# Patient Record
Sex: MALE | Race: OTHER | NOT HISPANIC OR LATINO | Employment: UNEMPLOYED | URBAN - METROPOLITAN AREA
[De-identification: names, ages, dates, MRNs, and addresses within clinical notes are randomized per-mention and may not be internally consistent; named-entity substitution may affect disease eponyms.]

---

## 2020-01-23 PROBLEM — S02.642D: Status: ACTIVE | Noted: 2020-01-23

## 2020-06-01 PROBLEM — K02.9 CARIES: Status: ACTIVE | Noted: 2020-06-01

## 2020-06-23 PROBLEM — K04.7 DENTAL ABSCESS: Status: ACTIVE | Noted: 2020-06-23

## 2021-01-14 PROBLEM — S62.234A CLOSED NONDISPLACED FRACTURE OF BASE OF FIRST METACARPAL BONE OF RIGHT HAND: Status: ACTIVE | Noted: 2021-01-14

## 2021-01-14 PROBLEM — M79.641 RIGHT HAND PAIN: Status: ACTIVE | Noted: 2021-01-14

## 2023-09-04 ENCOUNTER — HOSPITAL ENCOUNTER (OUTPATIENT)
Facility: HOSPITAL | Age: 28
Discharge: HOME | DRG: 683 | End: 2023-09-06
Attending: EMERGENCY MEDICINE | Admitting: INTERNAL MEDICINE
Payer: MEDICAID

## 2023-09-04 DIAGNOSIS — D72.829 LEUKOCYTOSIS, UNSPECIFIED TYPE: ICD-10-CM

## 2023-09-04 DIAGNOSIS — N17.9 AKI (ACUTE KIDNEY INJURY): ICD-10-CM

## 2023-09-04 DIAGNOSIS — R07.9 CHEST PAIN: ICD-10-CM

## 2023-09-04 DIAGNOSIS — E86.0 DEHYDRATION: Primary | ICD-10-CM

## 2023-09-04 DIAGNOSIS — E83.52 HYPERCALCEMIA: ICD-10-CM

## 2023-09-04 PROBLEM — N17.0 ACUTE RENAL FAILURE WITH TUBULAR NECROSIS: Status: ACTIVE | Noted: 2023-09-04

## 2023-09-04 LAB
ABS NEUT (OLG): 16.67 X10(3)/MCL (ref 2.1–9.2)
ALBUMIN SERPL-MCNC: 5.8 G/DL (ref 3.5–5)
ALBUMIN/GLOB SERPL: 1.2 RATIO (ref 1.1–2)
ALP SERPL-CCNC: 88 UNIT/L (ref 40–150)
ALT SERPL-CCNC: 10 UNIT/L (ref 0–55)
AMPHET UR QL SCN: POSITIVE
APPEARANCE UR: ABNORMAL
AST SERPL-CCNC: 34 UNIT/L (ref 5–34)
BACTERIA #/AREA URNS AUTO: ABNORMAL /HPF
BARBITURATE SCN PRESENT UR: NEGATIVE
BENZODIAZ UR QL SCN: NEGATIVE
BILIRUB SERPL-MCNC: 1.7 MG/DL
BILIRUB UR QL STRIP.AUTO: NEGATIVE
BUN SERPL-MCNC: 32.7 MG/DL (ref 8.9–20.6)
CALCIUM SERPL-MCNC: 12.5 MG/DL (ref 8.4–10.2)
CANNABINOIDS UR QL SCN: POSITIVE
CHLORIDE SERPL-SCNC: 96 MMOL/L (ref 98–107)
CK SERPL-CCNC: 694 U/L (ref 30–200)
CO2 SERPL-SCNC: 21 MMOL/L (ref 22–29)
COCAINE UR QL SCN: NEGATIVE
COLOR UR: ABNORMAL
CREAT SERPL-MCNC: 5.34 MG/DL (ref 0.73–1.18)
CREAT UR-MCNC: 304.7 MG/DL (ref 63–166)
ERYTHROCYTE [DISTWIDTH] IN BLOOD BY AUTOMATED COUNT: 13.3 % (ref 11.5–17)
ETHANOL SERPL-MCNC: <10 MG/DL
FENTANYL UR QL SCN: NEGATIVE
GFR SERPLBLD CREATININE-BSD FMLA CKD-EPI: 14 MLS/MIN/1.73/M2
GLOBULIN SER-MCNC: 4.8 GM/DL (ref 2.4–3.5)
GLUCOSE SERPL-MCNC: 111 MG/DL (ref 74–100)
GLUCOSE UR QL STRIP.AUTO: NEGATIVE
HCT VFR BLD AUTO: 50.2 % (ref 42–52)
HGB BLD-MCNC: 17.6 G/DL (ref 14–18)
INSTRUMENT WBC (OLG): 20.84 X10(3)/MCL
KETONES UR QL STRIP.AUTO: ABNORMAL
LEUKOCYTE ESTERASE UR QL STRIP.AUTO: ABNORMAL
LIPASE SERPL-CCNC: 30 U/L
LYMPHOCYTES NFR BLD MANUAL: 10 %
LYMPHOCYTES NFR BLD MANUAL: 2.08 X10(3)/MCL
MACROCYTES BLD QL SMEAR: ABNORMAL
MAGNESIUM SERPL-MCNC: 2.3 MG/DL (ref 1.6–2.6)
MCH RBC QN AUTO: 31.4 PG (ref 27–31)
MCHC RBC AUTO-ENTMCNC: 35.1 G/DL (ref 33–36)
MCV RBC AUTO: 89.6 FL (ref 80–94)
MDMA UR QL SCN: NEGATIVE
MONOCYTES NFR BLD MANUAL: 10 %
MONOCYTES NFR BLD MANUAL: 2.08 X10(3)/MCL (ref 0.1–1.3)
MUCOUS THREADS URNS QL MICRO: ABNORMAL /LPF
NEUTROPHILS NFR BLD MANUAL: 80 %
NITRITE UR QL STRIP.AUTO: NEGATIVE
NRBC BLD AUTO-RTO: 0 %
NRBC BLD MANUAL-RTO: 0 %
OPIATES UR QL SCN: NEGATIVE
PCP UR QL: NEGATIVE
PH UR STRIP.AUTO: 5.5 [PH]
PH UR: 5.5 [PH] (ref 3–11)
PHOSPHATE SERPL-MCNC: 1.7 MG/DL (ref 2.3–4.7)
PLATELET # BLD AUTO: 392 X10(3)/MCL (ref 130–400)
PLATELET # BLD EST: NORMAL 10*3/UL
PMV BLD AUTO: 10.6 FL (ref 7.4–10.4)
POTASSIUM SERPL-SCNC: 5 MMOL/L (ref 3.5–5.1)
PROT SERPL-MCNC: 10.6 GM/DL (ref 6.4–8.3)
PROT UR QL STRIP.AUTO: ABNORMAL
PROT UR STRIP-MCNC: 117.1 MG/DL
RBC # BLD AUTO: 5.6 X10(6)/MCL (ref 4.7–6.1)
RBC #/AREA URNS AUTO: ABNORMAL /HPF
RBC MORPH BLD: ABNORMAL
RBC UR QL AUTO: ABNORMAL
SARS-COV-2 RDRP RESP QL NAA+PROBE: NEGATIVE
SODIUM SERPL-SCNC: 139 MMOL/L (ref 136–145)
SODIUM UR-SCNC: 29 MMOL/L
SP GR UR STRIP.AUTO: 1.02 (ref 1–1.03)
SQUAMOUS #/AREA URNS AUTO: ABNORMAL /HPF
URINE PROTEIN/CREATININE RATIO (OHS): 0.4
UROBILINOGEN UR STRIP-ACNC: 1
WBC # SPEC AUTO: 20.84 X10(3)/MCL (ref 4.5–11.5)
WBC #/AREA URNS AUTO: ABNORMAL /HPF

## 2023-09-04 PROCEDURE — 83735 ASSAY OF MAGNESIUM: CPT | Performed by: EMERGENCY MEDICINE

## 2023-09-04 PROCEDURE — 82550 ASSAY OF CK (CPK): CPT | Performed by: EMERGENCY MEDICINE

## 2023-09-04 PROCEDURE — 96376 TX/PRO/DX INJ SAME DRUG ADON: CPT

## 2023-09-04 PROCEDURE — 25000003 PHARM REV CODE 250

## 2023-09-04 PROCEDURE — 84100 ASSAY OF PHOSPHORUS: CPT | Performed by: EMERGENCY MEDICINE

## 2023-09-04 PROCEDURE — 11000001 HC ACUTE MED/SURG PRIVATE ROOM

## 2023-09-04 PROCEDURE — 96361 HYDRATE IV INFUSION ADD-ON: CPT

## 2023-09-04 PROCEDURE — 84300 ASSAY OF URINE SODIUM: CPT

## 2023-09-04 PROCEDURE — 81001 URINALYSIS AUTO W/SCOPE: CPT | Mod: 59 | Performed by: EMERGENCY MEDICINE

## 2023-09-04 PROCEDURE — 93005 ELECTROCARDIOGRAM TRACING: CPT

## 2023-09-04 PROCEDURE — G0378 HOSPITAL OBSERVATION PER HR: HCPCS

## 2023-09-04 PROCEDURE — 96375 TX/PRO/DX INJ NEW DRUG ADDON: CPT

## 2023-09-04 PROCEDURE — 96372 THER/PROPH/DIAG INJ SC/IM: CPT | Performed by: EMERGENCY MEDICINE

## 2023-09-04 PROCEDURE — 96374 THER/PROPH/DIAG INJ IV PUSH: CPT

## 2023-09-04 PROCEDURE — 80053 COMPREHEN METABOLIC PANEL: CPT | Performed by: EMERGENCY MEDICINE

## 2023-09-04 PROCEDURE — 63600175 PHARM REV CODE 636 W HCPCS: Performed by: EMERGENCY MEDICINE

## 2023-09-04 PROCEDURE — 82077 ASSAY SPEC XCP UR&BREATH IA: CPT | Performed by: EMERGENCY MEDICINE

## 2023-09-04 PROCEDURE — 21400001 HC TELEMETRY ROOM

## 2023-09-04 PROCEDURE — 83690 ASSAY OF LIPASE: CPT | Performed by: EMERGENCY MEDICINE

## 2023-09-04 PROCEDURE — 25000003 PHARM REV CODE 250: Performed by: INTERNAL MEDICINE

## 2023-09-04 PROCEDURE — 93010 EKG 12-LEAD: ICD-10-PCS | Mod: ,,, | Performed by: INTERNAL MEDICINE

## 2023-09-04 PROCEDURE — 87635 SARS-COV-2 COVID-19 AMP PRB: CPT | Performed by: EMERGENCY MEDICINE

## 2023-09-04 PROCEDURE — 85027 COMPLETE CBC AUTOMATED: CPT | Performed by: EMERGENCY MEDICINE

## 2023-09-04 PROCEDURE — 93010 ELECTROCARDIOGRAM REPORT: CPT | Mod: ,,, | Performed by: INTERNAL MEDICINE

## 2023-09-04 PROCEDURE — 80307 DRUG TEST PRSMV CHEM ANLYZR: CPT | Performed by: EMERGENCY MEDICINE

## 2023-09-04 PROCEDURE — 82570 ASSAY OF URINE CREATININE: CPT

## 2023-09-04 PROCEDURE — 25000003 PHARM REV CODE 250: Performed by: EMERGENCY MEDICINE

## 2023-09-04 PROCEDURE — 99291 CRITICAL CARE FIRST HOUR: CPT

## 2023-09-04 RX ORDER — ONDANSETRON 2 MG/ML
4 INJECTION INTRAMUSCULAR; INTRAVENOUS
Status: COMPLETED | OUTPATIENT
Start: 2023-09-04 | End: 2023-09-04

## 2023-09-04 RX ORDER — SODIUM,POTASSIUM PHOSPHATES 280-250MG
1 POWDER IN PACKET (EA) ORAL
Status: COMPLETED | OUTPATIENT
Start: 2023-09-04 | End: 2023-09-04

## 2023-09-04 RX ORDER — NALOXONE HCL 0.4 MG/ML
0.02 VIAL (ML) INJECTION
Status: DISCONTINUED | OUTPATIENT
Start: 2023-09-04 | End: 2023-09-06 | Stop reason: HOSPADM

## 2023-09-04 RX ORDER — SODIUM CHLORIDE 0.9 % (FLUSH) 0.9 %
10 SYRINGE (ML) INJECTION EVERY 12 HOURS PRN
Status: DISCONTINUED | OUTPATIENT
Start: 2023-09-04 | End: 2023-09-06 | Stop reason: HOSPADM

## 2023-09-04 RX ORDER — GLUCAGON 1 MG
1 KIT INJECTION
Status: DISCONTINUED | OUTPATIENT
Start: 2023-09-04 | End: 2023-09-06 | Stop reason: HOSPADM

## 2023-09-04 RX ORDER — IBUPROFEN 200 MG
16 TABLET ORAL
Status: DISCONTINUED | OUTPATIENT
Start: 2023-09-04 | End: 2023-09-06 | Stop reason: HOSPADM

## 2023-09-04 RX ORDER — DICYCLOMINE HYDROCHLORIDE 10 MG/ML
20 INJECTION INTRAMUSCULAR
Status: COMPLETED | OUTPATIENT
Start: 2023-09-04 | End: 2023-09-04

## 2023-09-04 RX ORDER — ENOXAPARIN SODIUM 100 MG/ML
30 INJECTION SUBCUTANEOUS EVERY 24 HOURS
Status: DISCONTINUED | OUTPATIENT
Start: 2023-09-04 | End: 2023-09-04

## 2023-09-04 RX ORDER — MORPHINE SULFATE 4 MG/ML
4 INJECTION, SOLUTION INTRAMUSCULAR; INTRAVENOUS
Status: COMPLETED | OUTPATIENT
Start: 2023-09-04 | End: 2023-09-04

## 2023-09-04 RX ORDER — FAMOTIDINE 10 MG/ML
20 INJECTION INTRAVENOUS
Status: COMPLETED | OUTPATIENT
Start: 2023-09-04 | End: 2023-09-04

## 2023-09-04 RX ORDER — MUPIROCIN 20 MG/G
OINTMENT TOPICAL 2 TIMES DAILY
Status: DISCONTINUED | OUTPATIENT
Start: 2023-09-04 | End: 2023-09-06 | Stop reason: HOSPADM

## 2023-09-04 RX ORDER — IBUPROFEN 200 MG
24 TABLET ORAL
Status: DISCONTINUED | OUTPATIENT
Start: 2023-09-04 | End: 2023-09-06 | Stop reason: HOSPADM

## 2023-09-04 RX ORDER — SODIUM CHLORIDE 9 MG/ML
INJECTION, SOLUTION INTRAVENOUS CONTINUOUS
Status: DISCONTINUED | OUTPATIENT
Start: 2023-09-04 | End: 2023-09-06 | Stop reason: HOSPADM

## 2023-09-04 RX ORDER — ONDANSETRON 2 MG/ML
4 INJECTION INTRAMUSCULAR; INTRAVENOUS EVERY 6 HOURS PRN
Status: DISCONTINUED | OUTPATIENT
Start: 2023-09-04 | End: 2023-09-06 | Stop reason: HOSPADM

## 2023-09-04 RX ADMIN — SODIUM CHLORIDE 1000 ML: 9 INJECTION, SOLUTION INTRAVENOUS at 06:09

## 2023-09-04 RX ADMIN — ONDANSETRON 4 MG: 2 INJECTION INTRAMUSCULAR; INTRAVENOUS at 08:09

## 2023-09-04 RX ADMIN — POTASSIUM & SODIUM PHOSPHATES POWDER PACK 280-160-250 MG 1 PACKET: 280-160-250 PACK at 06:09

## 2023-09-04 RX ADMIN — FAMOTIDINE 20 MG: 10 INJECTION, SOLUTION INTRAVENOUS at 06:09

## 2023-09-04 RX ADMIN — POTASSIUM & SODIUM PHOSPHATES POWDER PACK 280-160-250 MG 1 PACKET: 280-160-250 PACK at 01:09

## 2023-09-04 RX ADMIN — ONDANSETRON 4 MG: 2 INJECTION INTRAMUSCULAR; INTRAVENOUS at 06:09

## 2023-09-04 RX ADMIN — SODIUM CHLORIDE 1000 ML: 9 INJECTION, SOLUTION INTRAVENOUS at 07:09

## 2023-09-04 RX ADMIN — MORPHINE SULFATE 4 MG: 4 INJECTION INTRAVENOUS at 08:09

## 2023-09-04 RX ADMIN — DICYCLOMINE HYDROCHLORIDE 20 MG: 20 INJECTION, SOLUTION INTRAMUSCULAR at 06:09

## 2023-09-04 RX ADMIN — MUPIROCIN: 20 OINTMENT TOPICAL at 10:09

## 2023-09-04 RX ADMIN — SODIUM CHLORIDE: 9 INJECTION, SOLUTION INTRAVENOUS at 08:09

## 2023-09-04 NOTE — ED PROVIDER NOTES
Encounter Date: 9/4/2023       History     Chief Complaint   Patient presents with    Vomiting    Spasms     C/o generalized muscle cramps, alberto flank pain,  decreased appetite, and states he hasn't urinated x2 days.... states the symptoms started a day ago, when asked again. Pt has multiple complaints, story is very hard to follow. Denies any medical/sx hx.      28-year-old male with no significant past medical history presenting to the ED for evaluation of multiple complaints.  He endorses general myalgias and abdominal cramping as well as nausea and vomiting for the past 1-2 days.  He states he was not urinated since Friday.  He states he is had frequent belching which is often followed by nonbloody nonbilious emesis.  Last bowel movement was also on Friday.  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  He did not work outside and Friday but did on Saturday.  He denies any fever or chills, chest pain or shortness of breath and complains of bilateral flank pain    The history is provided by the patient and a relative. No  was used.   Emesis   This is a new problem. The current episode started yesterday. The problem occurs 5 - 10 times per day. The problem has been unchanged. The emesis has an appearance of stomach contents. Associated symptoms include abdominal pain and myalgias. Pertinent negatives include no cough and no fever.   Spasms  This is a new problem. The current episode started yesterday. The problem occurs constantly. The problem has not changed since onset.Associated symptoms include abdominal pain. Pertinent negatives include no chest pain and no shortness of breath. Nothing aggravates the symptoms. Nothing relieves the symptoms. He has tried nothing for the symptoms. The treatment provided no relief.     Review of patient's allergies indicates:  No Known Allergies  History reviewed. No pertinent past medical history.  Past Surgical  History:   Procedure Laterality Date    ORIF MANDIBULAR FRACTURE Bilateral 1/23/2020    Procedure: ORIF, FRACTURE, MANDIBLE SYMPHYSIS and LEFT ANGLE;  Surgeon: Klaus Rodriguez DDS;  Location: [facility] MAIN OR;  Service: Oral Surgery;  Laterality: Bilateral;     No family history on file.  Social History     Tobacco Use    Smoking status: Every Day     Current packs/day: 1.00     Types: Cigarettes    Smokeless tobacco: Former   Substance Use Topics    Alcohol use: Yes     Comment: occasionally    Drug use: Yes     Types: Marijuana     Review of Systems   Constitutional:  Positive for appetite change. Negative for activity change, diaphoresis, fatigue and fever.   HENT:  Positive for rhinorrhea. Negative for congestion, postnasal drip, sinus pain, sneezing and sore throat.    Respiratory:  Negative for cough, chest tightness, shortness of breath and wheezing.    Cardiovascular:  Negative for chest pain, palpitations and leg swelling.   Gastrointestinal:  Positive for abdominal pain, constipation, nausea and vomiting. Negative for abdominal distention and blood in stool.   Genitourinary:  Negative for decreased urine volume, difficulty urinating and dysuria.   Musculoskeletal:  Positive for myalgias.   Skin:  Negative for color change and pallor.   Neurological:  Negative for dizziness, speech difficulty, weakness, light-headedness and numbness.   All other systems reviewed and are negative.      Physical Exam     Initial Vitals [09/04/23 0541]   BP Pulse Resp Temp SpO2   121/69 87 18 97.4 °F (36.3 °C) 98 %      MAP       --         Physical Exam    Nursing note and vitals reviewed.  Constitutional: He appears well-developed and well-nourished. He is not diaphoretic. No distress.   HENT:   Head: Normocephalic and atraumatic.   Nose: Nose normal.   Mouth/Throat: Oropharynx is clear and moist.   Eyes: Conjunctivae and EOM are normal. Pupils are equal, round, and reactive to light.   Neck: Trachea normal. Neck supple.    Normal range of motion.  Cardiovascular:  Normal rate, regular rhythm, normal heart sounds and intact distal pulses.     Exam reveals no gallop and no friction rub.       No murmur heard.  Pulmonary/Chest: Breath sounds normal. No respiratory distress. He has no wheezes. He has no rhonchi. He has no rales. He exhibits no tenderness.   Abdominal: Abdomen is soft. He exhibits no distension and no mass. There is no abdominal tenderness.   Hypoactive bowel sonds There is no rebound.   Musculoskeletal:         General: Tenderness present. No edema. Normal range of motion.      Cervical back: Normal range of motion and neck supple.      Lumbar back: Normal. No tenderness. Normal range of motion.      Comments: Bilateral flank tenderness to palpation     Neurological: He is alert and oriented to person, place, and time. He has normal strength. No cranial nerve deficit or sensory deficit.   Skin: Skin is warm and dry. Capillary refill takes less than 2 seconds. No rash and no abscess noted. No erythema. No pallor.   Psychiatric: He has a normal mood and affect. His behavior is normal. Judgment and thought content normal.         ED Course   Critical Care    Date/Time: 9/4/2023 7:47 AM    Performed by: Susana Tovar MD  Authorized by: Susana Tovar MD  Direct patient critical care time: 65 minutes  Total critical care time (exclusive of procedural time) : 65 minutes  Critical care was necessary to treat or prevent imminent or life-threatening deterioration of the following conditions: dehydration, metabolic crisis and renal failure.  Critical care was time spent personally by me on the following activities: development of treatment plan with patient or surrogate, discussions with consultants, evaluation of patient's response to treatment, examination of patient, obtaining history from patient or surrogate, ordering and performing treatments and interventions, ordering and review of laboratory studies, ordering  and review of radiographic studies, pulse oximetry, re-evaluation of patient's condition and review of old charts.        Labs Reviewed   COMPREHENSIVE METABOLIC PANEL - Abnormal; Notable for the following components:       Result Value    Chloride 96 (*)     Carbon Dioxide 21 (*)     Glucose Level 111 (*)     Blood Urea Nitrogen 32.7 (*)     Creatinine 5.34 (*)     Calcium Level Total 12.5 (*)     Protein Total 10.6 (*)     Albumin Level 5.8 (*)     Globulin 4.8 (*)     Bilirubin Total 1.7 (*)     All other components within normal limits   URINALYSIS, REFLEX TO URINE CULTURE - Abnormal; Notable for the following components:    Appearance, UA Turbid (*)     Protein, UA 2+ (*)     Ketones, UA Trace (*)     Blood, UA Trace (*)     Leukocyte Esterase, UA Trace (*)     All other components within normal limits   DRUG SCREEN, URINE (BEAKER) - Abnormal; Notable for the following components:    Amphetamines, Urine Positive (*)     Cannabinoids, Urine Positive (*)     All other components within normal limits    Narrative:     Cut off concentrations:    Amphetamines - 1000 ng/ml  Barbiturates - 200 ng/ml  Benzodiazepine - 200 ng/ml  Cannabinoids (THC) - 50 ng/ml  Cocaine - 300 ng/ml  Fentanyl - 1.0 ng/ml  MDMA - 500 ng/ml  Opiates - 300 ng/ml   Phencyclidine (PCP) - 25 ng/ml    Specimen submitted for drug analysis and tested for pH and specific gravity in order to evaluate sample integrity. Suspect tampering if specific gravity is <1.003 and/or pH is not within the range of 4.5 - 8.0  False negatives may result form substances such as bleach added to urine.  False positives may result for the presence of a substance with similar chemical structure to the drug or its metabolite.    This test provides only a PRELIMINARY analytical test result. A more specific alternate chemical method must be used in order to obtain a confirmed analytical result. Gas chromatography/mass spectrometry (GC/MS) is the preferred confirmatory  method. Other chemical confirmation methods are available. Clinical consideration and professional judgement should be applied to any drug of abuse test result, particularly when preliminary positive results are used.    Positive results will be confirmed only at the physicians request. Unconfirmed screening results are to be used only for medical purposes (treatment).        CBC WITH DIFFERENTIAL - Abnormal; Notable for the following components:    WBC 20.84 (*)     MCH 31.4 (*)     MPV 10.6 (*)     All other components within normal limits   MANUAL DIFFERENTIAL - Abnormal; Notable for the following components:    Neutrophils Abs 16.672 (*)     Monocytes Abs 2.084 (*)     RBC Morph Abnormal (*)     Macrocytosis 1+ (*)     All other components within normal limits   PHOSPHORUS - Abnormal; Notable for the following components:    Phosphorus Level 1.7 (*)     All other components within normal limits   CK - Abnormal; Notable for the following components:    Creatine Kinase 694 (*)     All other components within normal limits   URINALYSIS, MICROSCOPIC - Abnormal; Notable for the following components:    Mucous, UA Occ (*)     All other components within normal limits   SARS-COV-2 RNA AMPLIFICATION, QUAL - Normal    Narrative:     The IDNOW COVID-19 assay is a rapid molecular in vitro diagnostic test utilizing an isothermal nucleic acid amplification technology intended for the qualitative detection of nucleic acid from the SARS-CoV-2 viral RNA in direct nasal, nasopharyngeal or throat swabs from individuals who are suspected of COVID-19 by their healthcare provider.   LIPASE - Normal   ALCOHOL,MEDICAL (ETHANOL) - Normal   MAGNESIUM - Normal   CBC W/ AUTO DIFFERENTIAL    Narrative:     The following orders were created for panel order CBC auto differential.  Procedure                               Abnormality         Status                     ---------                               -----------         ------                      CBC with Differential[[id number]]        Abnormal            Final result               Manual Differential[[id number]]          Abnormal            Final result                 Please view results for these tests on the individual orders.   PROTEIN / CREATININE RATIO, URINE   SODIUM, URINE, RANDOM          Imaging Results              CT Abdomen Pelvis  Without Contrast (Final result)  Result time 09/04/23 09:04:53      Final result by Tobi Cabral MD (09/04/23 09:04:53)                   Impression:      No acute abnormality seen      Electronically signed by: Tobi Cabral  Date:    09/04/2023  Time:    09:04               Narrative:    EXAMINATION:  CT ABDOMEN PELVIS WITHOUT CONTRAST    CLINICAL HISTORY:  Abdominal pain, acute, nonlocalized;    TECHNIQUE:  Low dose axial images, sagittal and coronal reformations were obtained from the lung bases to the pubic symphysis.  No contrast was administered.  Automatic exposure control is utilized to reduce patient radiation exposure.    COMPARISON:  None    FINDINGS:  The lung bases are clear.    The liver appears normal.  No obvious liver mass or lesion is seen.    Gallbladder appears normal.  No gallstones are seen.    The pancreas appears normal.  No inflammatory changes are seen in the pancreatic region.    The spleen appears normal and adrenal glands appear normal.  No adrenal nodule is seen.    No nephrolithiasis is seen.  No hydronephrosis is seen.  No hydroureter is seen.  No ureteral stone is seen.    No colitis is seen.  No diverticulitis is seen.  No appendicitis is seen.    No free air seen.  No free fluid is seen.  The urinary bladder appears normal.    There is a lytic area seen within the iliac bone on the left side which has a benign appearance.                                       US Retroperitoneal Limited (Final result)  Result time 09/04/23 08:15:25      Final result by Zechariah Brown MD (09/04/23 08:15:25)                    Impression:      1. No hydronephrosis.  2. Possible medical renal disease.      Electronically signed by: Zechariah Brown  Date:    09/04/2023  Time:    08:15               Narrative:    EXAMINATION:  US RETROPERITONEAL LIMITED    CLINICAL HISTORY:  Acute kidney failure, unspecified    COMPARISON:  None.    FINDINGS:  Grayscale and color Doppler sonographic evaluation of the kidneys and urinary bladder.    The right kidney measures 8.5 cm. The left kidney measures 9 cm.   No hydronephrosis.  There is slightly increased renal parenchymal echogenicity.    The bladder is not well distended.                                       Medications   0.9%  NaCl infusion ( Intravenous New Bag 9/4/23 0845)   sodium chloride 0.9% flush 10 mL (has no administration in time range)   naloxone 0.4 mg/mL injection 0.02 mg (has no administration in time range)   glucose chewable tablet 16 g (has no administration in time range)   glucose chewable tablet 24 g (has no administration in time range)   glucagon (human recombinant) injection 1 mg (has no administration in time range)   enoxaparin injection 30 mg (has no administration in time range)   dextrose 10% bolus 125 mL 125 mL (has no administration in time range)   dextrose 10% bolus 250 mL 250 mL (has no administration in time range)   ondansetron injection 4 mg (has no administration in time range)   sodium chloride 0.9% bolus 1,000 mL 1,000 mL (0 mLs Intravenous Stopped 9/4/23 0739)   ondansetron injection 4 mg (4 mg Intravenous Given 9/4/23 0639)   dicyclomine injection 20 mg (20 mg Intramuscular Given 9/4/23 0639)   famotidine (PF) injection 20 mg (20 mg Intravenous Given 9/4/23 0639)   sodium chloride 0.9% bolus 1,000 mL 1,000 mL (0 mLs Intravenous Stopped 9/4/23 0845)   morphine injection 4 mg (4 mg Intravenous Given 9/4/23 0816)   ondansetron injection 4 mg (4 mg Intravenous Given 9/4/23 0815)     Medical Decision Making  Given patient's presentation, differential diagnosis  includes but is not limited to florencio, uti, ureterolithiasis, dehydration, electrolyte derangement, rhabdomyolysis, viral syndrome  To evaluate these  possible etiologies cbc, cmp, ua, uds, ethanol, covid, mag, phos, ck were ordered and reviewed    Leukocytosis likely demargination due to stress, acute significant oliguric renal failure without significant acidosis or hyperkalemia  Given ivf, zofran and pepcid which resolved nausea and vomiting  Bentyl helped with abdominal cramping  Retroperitoneal us ordered    Problems Addressed:  FLORENCIO (acute kidney injury): acute illness or injury that poses a threat to life or bodily functions  Dehydration: acute illness or injury that poses a threat to life or bodily functions  Hypercalcemia: acute illness or injury that poses a threat to life or bodily functions  Leukocytosis, unspecified type: acute illness or injury    Amount and/or Complexity of Data Reviewed  Independent Historian: caregiver  External Data Reviewed: notes.  Labs: ordered.  Radiology: ordered.    Risk  OTC drugs.  Prescription drug management.  Parenteral controlled substances.  Decision regarding hospitalization.    Critical Care  Total time providing critical care: 65 minutes               ED Course as of 09/04/23 1150   Mon Sep 04, 2023   0732 Nausea and pain have improved [BS]   0734 States he was not been taking any over-the-counter medications [BS]      ED Course User Index  [BS] Susana Tovar MD               Medical Decision Making:   History:   Old Medical Records: I decided to obtain old medical records.  Old Records Summarized: records from previous admission(s).       <> Summary of Records: Previous ed visits for unrelated complaints  Initial Assessment:   See hpi  Clinical Tests:   Lab Tests: Ordered and Reviewed  Radiological Study: Ordered and Reviewed  Other:   I have discussed this case with another health care provider.      Clinical Impression:   Final diagnoses:  [N17.9] FLORENCIO (acute  kidney injury)  [E86.0] Dehydration (Primary)  [E83.52] Hypercalcemia  [D72.829] Leukocytosis, unspecified type        ED Disposition Condition    Admit Stable                Susana Tovar MD  09/04/23 4995

## 2023-09-04 NOTE — H&P
[facility] MEDICINE - H&P ADMISSION NOTE    Patient Name: Amirah Moore  MRN: [id number]  Patient Class: IP- Inpatient   Admission Date: 9/4/2023   Admitting Physician: KUSHAL Service   Attending Physician: Steven Ramirez MD  Primary Care Provider: Malena Primary Doctor  Face-to-Face encounter date: 09/04/2023      CHIEF COMPLAINT     Chief Complaint   Patient presents with    Vomiting    Spasms     C/o generalized muscle cramps, alberto flank pain,  decreased appetite, and states he hasn't urinated x2 days.... states the symptoms started a day ago, when asked again. Pt has multiple complaints, story is very hard to follow. Denies any medical/sx hx.        HISTORY OF PRESENTING ILLNESS     Mr. Moore is a 29 y/o AAM with no significant PMH who presents to [facility] ED with initial complaint of general myalgias and abdominal cramping as well as multiple episodes of non-bloody non bilious emesis x2 days.  He also reports difficulty with urination since Friday stating he had significant pain upon initiation of urination.  Denies any dysuria or frequency.  Endorses bilateral flank pain beginning 2 days ago.  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  He attempted to stay hydrated but states he does not think he was drinking enough water and endorses associated heat exhaustion.  He denies any history of similar prior episodes and has no known medical history for which he takes daily medications.  He denies any recent otc NSAID use.  He does endorse recent marijuana use but denies any other recreational drug use or history of IV drug use.  He uses a vape daily but denies any cigarette use or other tobacco products.  He reports drinking alcohol socially and denies daily alcohol intake.  Of note, patient was recently released from an 8-year group home sentence approx 2 weeks ago.     In the ED, patients vitals were stable with HR 87, RR  18, /69, temp 97.4, and spO2 99% on room air.  Labs were significant for WBC 20.84, BUN/Cr of 32.7/5.34, calcium of 12.5, phosphorous of 1.7, CPK of 694, UDS positive for amphetamines and marijuana, and urinalysis with 2+ protein.  Retroperitoneal US performed showing no hydronephrosis with possible medical renal disease.  CT A/P was also performed which showed no acute abnormalities.  Internal medicine was consulted secondary to acute renal failure.      PAST MEDICAL HISTORY   History reviewed. No pertinent past medical history.    PAST SURGICAL HISTORY     Past Surgical History:   Procedure Laterality Date    ORIF MANDIBULAR FRACTURE Bilateral 1/23/2020    Procedure: ORIF, FRACTURE, MANDIBLE SYMPHYSIS and LEFT ANGLE;  Surgeon: Klaus Rodriguez DDS;  Location: [facility] OR;  Service: Oral Surgery;  Laterality: Bilateral;       FAMILY HISTORY   Reviewed and noncontributory to this case    SOCIAL HISTORY     Social History     Socioeconomic History    Marital status: Single   Tobacco Use    Smoking status: Every Day     Current packs/day: 1.00     Types: Cigarettes    Smokeless tobacco: Former   Substance and Sexual Activity    Alcohol use: Yes     Comment: occasionally    Drug use: Yes     Types: Marijuana    Sexual activity: Not Currently     Social Determinants of Health     Stress: No Stress Concern Present (8/4/2020)    [employer] [place] [employer] - Occupational Stress Questionnaire     Feeling of Stress : Not at all       HOME MEDICATIONS     Prior to Admission medications    Medication Sig Start Date End Date Taking? Authorizing Provider   chlorhexidine (PERIDEX) 0.12 % solution Swish and spit 15 mL two times daily.  Patient not taking: Reported on 8/4/2020 1/23/20   Ena Sameera, DDS   hydrocodone-acetaminophen (HYCET) solution 7.5-325 mg/15mL Take 15 mLs by mouth every 6 (six) hours as needed for Pain.  Patient not taking: Reported on 2/24/2020 1/23/20   Ena Sameera, DDS   ibuprofen  (ADVIL,MOTRIN) 100 mg/5 mL suspension Take 30 mLs (600 mg total) by mouth every 6 (six) hours as needed for Temperature greater than.  Patient not taking: Reported on 8/4/2020 1/23/20   Sameera Sutherland DDS   ibuprofen (ADVIL,MOTRIN) 600 MG tablet Take 1 tablet (600 mg total) by mouth 3 (three) times daily.  Patient not taking: Reported on 8/4/2020 6/23/20   Triny Holguin MD   ibuprofen (ADVIL,MOTRIN) 800 MG tablet Take 800 mg by mouth 2 (two) times a day.    Provider, Historical   omeprazole (PRILOSEC) 20 MG capsule Take 20 mg by mouth once daily.    Provider, Historical   ondansetron (ZOFRAN-ODT) 4 MG TbDL Take 1 tablet (4 mg total) by mouth every 12 (twelve) hours.  Patient not taking: Reported on 2/10/2020 1/23/20   Sameera Sutherland DDS     ALLERGIES   Patient has no known allergies.    REVIEW OF SYSTEMS   Except as documented above, all other systems reviewed and negative    PHYSICAL EXAM     Vitals:    09/04/23 1102   BP: 121/66   Pulse: 78   Resp:    Temp:       General:  In no acute distress, resting comfortably  Head and neck:  Atraumatic, normocephalic, moist mucous membranes, supple neck  Chest:  Clear to auscultation bilaterally  Heart:  S1, S2, no appreciable murmur  Abdomen:  Soft, nontender, BS +, bilateral flank pain present   MSK:  Warm, no lower extremity edema, no clubbing or cyanosis  Neuro:  Alert and oriented x4, moving all extremities with good strength  Integumentary:  No obvious skin rash  Psychiatry:  Appropriate mood and affect    ASSESSMENT AND PLAN     Acute renal failure likely 2/2 ATN   Leukocytosis suspect 2/2 hemoconcentration   Hypercalcemia   Hypophosphatemia   Polysubstance abuse   Mild rhabdomyolysis   Lytic lesion of left iliac bone     -Retroperitoneal US and CT A/P performed in ED showing no acute abnormalities although CT scan was notable for lytic lesion present to left iliac bone.    -given 2L NS in ED, will continue fluid resuscitation with NS @125ml/hr   -ordering urine  sodium and urine protein/creatinine ratio for further workup, will continue to monitor renal indices closely.  Avoid nephrotoxic medications.   -ordering PTH in setting of above electrolyte abnormalities. Also ordering SPEP for further workup of lytic lesion.   -continue to monitor electrolytes closely and replete as needed    DVT prophylaxis:  patient ambulatory   __________________________________________________________________  LABS/MICRO/MEDS/DIAGNOSTICS     LABS  Recent Labs     09/04/23  0632      K 5.0   CHLORIDE 96*   CO2 21*   BUN 32.7*   CREATININE 5.34*   GLUCOSE 111*   CALCIUM 12.5*   ALKPHOS 88   AST 34   ALT 10   ALBUMIN 5.8*     Recent Labs     09/04/23  0632   WBC 20.84  20.84*   RBC 5.60   HCT 50.2   MCV 89.6        MICROBIOLOGY  Microbiology Results (last 7 days)       ** No results found for the last 168 hours. **          MEDICATIONS   mupirocin   Nasal BID    potassium, sodium phosphates  1 packet Oral Q6H      INFUSIONS   sodium chloride 0.9% 125 mL/hr at 09/04/23 1154     DIAGNOSTIC TESTS  CT Abdomen Pelvis  Without Contrast   Final Result      No acute abnormality seen         Electronically signed by: Tobi Cabral   Date:    09/04/2023   Time:    09:04      US Retroperitoneal Limited   Final Result      1. No hydronephrosis.   2. Possible medical renal disease.         Electronically signed by: Zechariah Brown   Date:    09/04/2023   Time:    08:15         Patient information was obtained from patient, patient's family, past medical records and ER records.   All diagnosis and differential diagnosis have been reviewed; assessment and plan has been documented. I have personally reviewed the labs and test results that are presently available; I have reviewed the patients medication list. I have reviewed the consulting providers response and recommendations. I have reviewed or attempted to review medical records based upon their availability.  All of the patient's questions  have been addressed and answered. Patient's is agreeable to the above stated plan. I will continue to monitor closely and make adjustments to medical management as needed.  This note was created using [employer] voice recognition software that occasionally misinterpreted phrases or words.  Please contact me if any questions may rise regarding documentation to clarify verbiage.      Aaliyah Hi MD   Internal Medicine  Department of Hospital Medicine  [facility] - Emergency Dept

## 2023-09-05 LAB
ALBUMIN SERPL-MCNC: 3.4 G/DL (ref 3.5–5)
ALBUMIN/GLOB SERPL: 1.2 RATIO (ref 1.1–2)
ALP SERPL-CCNC: 54 UNIT/L (ref 40–150)
ALT SERPL-CCNC: 7 UNIT/L (ref 0–55)
AST SERPL-CCNC: 29 UNIT/L (ref 5–34)
BASOPHILS # BLD AUTO: 0.02 X10(3)/MCL
BASOPHILS NFR BLD AUTO: 0.2 %
BILIRUB SERPL-MCNC: 1 MG/DL
BUN SERPL-MCNC: 30.1 MG/DL (ref 8.9–20.6)
CALCIUM SERPL-MCNC: 8.9 MG/DL (ref 8.4–10.2)
CHLORIDE SERPL-SCNC: 106 MMOL/L (ref 98–107)
CO2 SERPL-SCNC: 25 MMOL/L (ref 22–29)
CREAT SERPL-MCNC: 1.96 MG/DL (ref 0.73–1.18)
EOSINOPHIL # BLD AUTO: 0.06 X10(3)/MCL (ref 0–0.9)
EOSINOPHIL NFR BLD AUTO: 0.7 %
ERYTHROCYTE [DISTWIDTH] IN BLOOD BY AUTOMATED COUNT: 13.8 % (ref 11.5–17)
GFR SERPLBLD CREATININE-BSD FMLA CKD-EPI: 47 MLS/MIN/1.73/M2
GLOBULIN SER-MCNC: 2.8 GM/DL (ref 2.4–3.5)
GLUCOSE SERPL-MCNC: 93 MG/DL (ref 74–100)
HCT VFR BLD AUTO: 38.4 % (ref 42–52)
HGB BLD-MCNC: 12.9 G/DL (ref 14–18)
IGA SERPL-MCNC: 244 MG/DL (ref 63–484)
IGG SERPL-MCNC: 1029 MG/DL (ref 540–1822)
IGM SERPL-MCNC: 57 MG/DL (ref 22–240)
IMM GRANULOCYTES # BLD AUTO: 0.02 X10(3)/MCL (ref 0–0.04)
IMM GRANULOCYTES NFR BLD AUTO: 0.2 %
LYMPHOCYTES # BLD AUTO: 2.57 X10(3)/MCL (ref 0.6–4.6)
LYMPHOCYTES NFR BLD AUTO: 31.7 %
MAGNESIUM SERPL-MCNC: 2.2 MG/DL (ref 1.6–2.6)
MCH RBC QN AUTO: 31.9 PG (ref 27–31)
MCHC RBC AUTO-ENTMCNC: 33.6 G/DL (ref 33–36)
MCV RBC AUTO: 95 FL (ref 80–94)
MONOCYTES # BLD AUTO: 0.89 X10(3)/MCL (ref 0.1–1.3)
MONOCYTES NFR BLD AUTO: 11 %
NEUTROPHILS # BLD AUTO: 4.54 X10(3)/MCL (ref 2.1–9.2)
NEUTROPHILS NFR BLD AUTO: 56.2 %
NRBC BLD AUTO-RTO: 0 %
PHOSPHATE SERPL-MCNC: 3.7 MG/DL (ref 2.3–4.7)
PLATELET # BLD AUTO: 265 X10(3)/MCL (ref 130–400)
PMV BLD AUTO: 10.5 FL (ref 7.4–10.4)
POTASSIUM SERPL-SCNC: 4.2 MMOL/L (ref 3.5–5.1)
PROT SERPL-MCNC: 6.2 GM/DL (ref 6.4–8.3)
PTH-INTACT SERPL-MCNC: 68.9 PG/ML (ref 8.7–77)
RBC # BLD AUTO: 4.04 X10(6)/MCL (ref 4.7–6.1)
SODIUM SERPL-SCNC: 138 MMOL/L (ref 136–145)
WBC # SPEC AUTO: 8.1 X10(3)/MCL (ref 4.5–11.5)

## 2023-09-05 PROCEDURE — 83970 ASSAY OF PARATHORMONE: CPT

## 2023-09-05 PROCEDURE — 96361 HYDRATE IV INFUSION ADD-ON: CPT

## 2023-09-05 PROCEDURE — 84100 ASSAY OF PHOSPHORUS: CPT

## 2023-09-05 PROCEDURE — G0378 HOSPITAL OBSERVATION PER HR: HCPCS

## 2023-09-05 PROCEDURE — 83521 IG LIGHT CHAINS FREE EACH: CPT

## 2023-09-05 PROCEDURE — 83735 ASSAY OF MAGNESIUM: CPT

## 2023-09-05 PROCEDURE — 86334 IMMUNOFIX E-PHORESIS SERUM: CPT

## 2023-09-05 PROCEDURE — 85025 COMPLETE CBC W/AUTO DIFF WBC: CPT

## 2023-09-05 PROCEDURE — 80053 COMPREHEN METABOLIC PANEL: CPT

## 2023-09-05 PROCEDURE — 84165 PROTEIN E-PHORESIS SERUM: CPT

## 2023-09-05 PROCEDURE — 25000003 PHARM REV CODE 250

## 2023-09-05 PROCEDURE — 21400001 HC TELEMETRY ROOM

## 2023-09-05 PROCEDURE — 82784 ASSAY IGA/IGD/IGG/IGM EACH: CPT

## 2023-09-05 RX ADMIN — MUPIROCIN: 20 OINTMENT TOPICAL at 08:09

## 2023-09-05 RX ADMIN — SODIUM CHLORIDE: 9 INJECTION, SOLUTION INTRAVENOUS at 10:09

## 2023-09-05 RX ADMIN — MUPIROCIN: 20 OINTMENT TOPICAL at 09:09

## 2023-09-05 NOTE — NURSING
Nurses Note -- 4 Eyes      9/5/2023   5:49 AM      Skin assessed during: Admit      [x] No Altered Skin Integrity Present    [x]Prevention Measures Documented      [] Yes- Altered Skin Integrity Present or Discovered   [] LDA Added if Not in Epic (Describe Wound)   [] New Altered Skin Integrity was Present on Admit and Documented in LDA   [] Wound Image Taken    Wound Care Consulted? No    Attending Nurse:  Kayden Hodges RN/Staff Member:   Sivakumar Rivas RN

## 2023-09-05 NOTE — PROGRESS NOTES
[facility]  Hospital Medicine Progress Note        Chief Complaint: Inpatient Follow-up    HPI:   29 y/o AAM with no significant PMH who presents to [facility] ED with initial complaint of general myalgias and abdominal cramping as well as multiple episodes of non-bloody non bilious emesis x2 days.  He also reports difficulty with urination since Friday stating he had significant pain upon initiation of urination.  Denies any dysuria or frequency.  Endorses bilateral flank pain beginning 2 days ago.  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  He attempted to stay hydrated but states he does not think he was drinking enough water and endorses associated heat exhaustion.  He denies any history of similar prior episodes and has no known medical history for which he takes daily medications.  He denies any recent otc NSAID use.  He does endorse recent marijuana use but denies any other recreational drug use or history of IV drug use.  He uses a vape daily but denies any cigarette use or other tobacco products.  He reports drinking alcohol socially and denies daily alcohol intake.  Of note, patient was recently released from an 8-year snf sentence approx 2 weeks ago.      In the ED, patients vitals were stable with HR 87, RR 18, /69, temp 97.4, and spO2 99% on room air.  Labs were significant for WBC 20.84, BUN/Cr of 32.7/5.34, calcium of 12.5, phosphorous of 1.7, CPK of 694, UDS positive for amphetamines and marijuana, and urinalysis with 2+ protein.  Retroperitoneal US performed showing no hydronephrosis with possible medical renal disease.  CT A/P was also performed which showed no acute abnormalities.  Hospital medicine service was consulted for admission and further management.       Interval Hx:   No acute events reported.  Seen and examined.  Patient reported feeling better denied any nausea vomiting labs this  morning reviewed his BUN to creatinine much improved with IV fluids.  BUN 30.1 creatinine 1.96 magnesium 2.2, phos 3.7, PTH intact within normal limits immunoglobulin levels within normal limits  Case was discussed with patient's nurse and  on the floor.    Objective/physical exam:  General: In no acute distress, afebrile  Chest: Clear to auscultation bilaterally  Heart: RRR, +S1, S2, no appreciable murmur  Abdomen: Soft, nontender, BS +  MSK: Warm, no lower extremity edema, no clubbing or cyanosis  Neurologic: Alert and oriented x4, Cranial nerve II-XII intact, Strength 5/5 in all 4 extremities    VITAL SIGNS: 24 HRS MIN & MAX LAST   Temp  Min: 97.3 °F (36.3 °C)  Max: 98 °F (36.7 °C) 98 °F (36.7 °C)   BP  Min: 96/57  Max: 136/91 115/74   Pulse  Min: 61  Max: 78  64   Resp  Min: 16  Max: 16 16   SpO2  Min: 95 %  Max: 100 % 98 %     I have reviewed the following labs:  Recent Labs   Lab 09/04/23  0632 09/05/23  0815   WBC 20.84  20.84* 8.10   RBC 5.60 4.04*   HGB 17.6 12.9*   HCT 50.2 38.4*   MCV 89.6 95.0*   MCH 31.4* 31.9*   MCHC 35.1 33.6   RDW 13.3 13.8    265   MPV 10.6* 10.5*     Recent Labs   Lab 09/04/23  0632 09/05/23  0402    138   K 5.0 4.2   CO2 21* 25   BUN 32.7* 30.1*   CREATININE 5.34* 1.96*   CALCIUM 12.5* 8.9   MG 2.30 2.20   ALBUMIN 5.8* 3.4*   ALKPHOS 88 54   ALT 10 7   AST 34 29   BILITOT 1.7* 1.0     Microbiology Results (last 7 days)       ** No results found for the last 168 hours. **             See below for Radiology    Scheduled Med:   mupirocin   Nasal BID      Continuous Infusions:   sodium chloride 0.9% 125 mL/hr at 09/05/23 1014      PRN Meds:  dextrose 10%, dextrose 10%, glucagon (human recombinant), glucose, glucose, naloxone, ondansetron, sodium chloride 0.9%     Assessment/Plan:  Acute kidney injury suspected  2/2 ATN   Leukocytosis suspect 2/2 hemoconcentration   Hypercalcemia   Hypophosphatemia   Polysubstance abuse   Mild rhabdomyolysis   Lytic lesion of  left iliac bone     Renal indices much improved calcium levels improved to 8.9  SPEP UPEP pending  Continue IV hydration today and monitor urine output  Provide supportive care  Recheck labs in the a.m.  Encourage p.o. intake   Encourage drug cessation  Care discussed with patient's nurse, case management  Labs in the a.m.    VTE prophylaxis:  SCDs patient ambulatory    Patient condition:  Stable    Anticipated discharge and Disposition:   Likely in the next 24-48 hours based on labs      _____________________________________________________________________    Nutrition Status:    Radiology:  I have personally reviewed the following imaging and agree with the radiologist.     CT Abdomen Pelvis  Without Contrast  Narrative: EXAMINATION:  CT ABDOMEN PELVIS WITHOUT CONTRAST    CLINICAL HISTORY:  Abdominal pain, acute, nonlocalized;    TECHNIQUE:  Low dose axial images, sagittal and coronal reformations were obtained from the lung bases to the pubic symphysis.  No contrast was administered.  Automatic exposure control is utilized to reduce patient radiation exposure.    COMPARISON:  None    FINDINGS:  The lung bases are clear.    The liver appears normal.  No obvious liver mass or lesion is seen.    Gallbladder appears normal.  No gallstones are seen.    The pancreas appears normal.  No inflammatory changes are seen in the pancreatic region.    The spleen appears normal and adrenal glands appear normal.  No adrenal nodule is seen.    No nephrolithiasis is seen.  No hydronephrosis is seen.  No hydroureter is seen.  No ureteral stone is seen.    No colitis is seen.  No diverticulitis is seen.  No appendicitis is seen.    No free air seen.  No free fluid is seen.  The urinary bladder appears normal.    There is a lytic area seen within the iliac bone on the left side which has a benign appearance.  Impression: No acute abnormality seen    Electronically signed by: Tobi Cabral  Date:    09/04/2023  Time:    09:04    Retroperitoneal Limited  Narrative: EXAMINATION:  US RETROPERITONEAL LIMITED    CLINICAL HISTORY:  Acute kidney failure, unspecified    COMPARISON:  None.    FINDINGS:  Grayscale and color Doppler sonographic evaluation of the kidneys and urinary bladder.    The right kidney measures 8.5 cm. The left kidney measures 9 cm.   No hydronephrosis.  There is slightly increased renal parenchymal echogenicity.    The bladder is not well distended.  Impression: 1. No hydronephrosis.  2. Possible medical renal disease.    Electronically signed by: Zechariah Brown  Date:    09/04/2023  Time:    08:15      Marti Roy MD   09/05/2023

## 2023-09-06 VITALS
HEART RATE: 74 BPM | TEMPERATURE: 98 F | SYSTOLIC BLOOD PRESSURE: 123 MMHG | BODY MASS INDEX: 29.64 KG/M2 | OXYGEN SATURATION: 100 % | WEIGHT: 195.56 LBS | DIASTOLIC BLOOD PRESSURE: 71 MMHG | RESPIRATION RATE: 16 BRPM | HEIGHT: 68 IN

## 2023-09-06 LAB
ALBUMIN SERPL-MCNC: 3.3 G/DL (ref 3.5–5)
ALBUMIN/GLOB SERPL: 1.2 RATIO (ref 1.1–2)
ALP SERPL-CCNC: 55 UNIT/L (ref 40–150)
ALT SERPL-CCNC: 6 UNIT/L (ref 0–55)
AST SERPL-CCNC: 23 UNIT/L (ref 5–34)
BASOPHILS # BLD AUTO: 0.02 X10(3)/MCL
BASOPHILS NFR BLD AUTO: 0.2 %
BILIRUB SERPL-MCNC: 0.6 MG/DL
BUN SERPL-MCNC: 19.3 MG/DL (ref 8.9–20.6)
CALCIUM SERPL-MCNC: 9 MG/DL (ref 8.4–10.2)
CHLORIDE SERPL-SCNC: 108 MMOL/L (ref 98–107)
CO2 SERPL-SCNC: 22 MMOL/L (ref 22–29)
CREAT SERPL-MCNC: 1.13 MG/DL (ref 0.73–1.18)
EOSINOPHIL # BLD AUTO: 0.08 X10(3)/MCL (ref 0–0.9)
EOSINOPHIL NFR BLD AUTO: 0.9 %
ERYTHROCYTE [DISTWIDTH] IN BLOOD BY AUTOMATED COUNT: 13.6 % (ref 11.5–17)
GFR SERPLBLD CREATININE-BSD FMLA CKD-EPI: >60 MLS/MIN/1.73/M2
GLOBULIN SER-MCNC: 2.8 GM/DL (ref 2.4–3.5)
GLUCOSE SERPL-MCNC: 90 MG/DL (ref 74–100)
HCT VFR BLD AUTO: 38.4 % (ref 42–52)
HGB BLD-MCNC: 13 G/DL (ref 14–18)
IMM GRANULOCYTES # BLD AUTO: 0.02 X10(3)/MCL (ref 0–0.04)
IMM GRANULOCYTES NFR BLD AUTO: 0.2 %
KAPPA LC FREE SER-MCNC: 2.24 MG/DL (ref 0.33–1.94)
KAPPA LC FREE/LAMBDA FREE SER: 1.4 {RATIO} (ref 0.26–1.65)
LAMBDA LC FREE SERPL-MCNC: 1.6 MG/DL (ref 0.57–2.63)
LYMPHOCYTES # BLD AUTO: 3.36 X10(3)/MCL (ref 0.6–4.6)
LYMPHOCYTES NFR BLD AUTO: 36.9 %
MAGNESIUM SERPL-MCNC: 2 MG/DL (ref 1.6–2.6)
MCH RBC QN AUTO: 31.8 PG (ref 27–31)
MCHC RBC AUTO-ENTMCNC: 33.9 G/DL (ref 33–36)
MCV RBC AUTO: 93.9 FL (ref 80–94)
MONOCYTES # BLD AUTO: 0.9 X10(3)/MCL (ref 0.1–1.3)
MONOCYTES NFR BLD AUTO: 9.9 %
NEUTROPHILS # BLD AUTO: 4.72 X10(3)/MCL (ref 2.1–9.2)
NEUTROPHILS NFR BLD AUTO: 51.9 %
NRBC BLD AUTO-RTO: 0 %
PHOSPHATE SERPL-MCNC: 4.1 MG/DL (ref 2.3–4.7)
PLATELET # BLD AUTO: 237 X10(3)/MCL (ref 130–400)
PMV BLD AUTO: 10.8 FL (ref 7.4–10.4)
POTASSIUM SERPL-SCNC: 4.5 MMOL/L (ref 3.5–5.1)
PROT SERPL-MCNC: 6.1 GM/DL (ref 6.4–8.3)
RBC # BLD AUTO: 4.09 X10(6)/MCL (ref 4.7–6.1)
SODIUM SERPL-SCNC: 137 MMOL/L (ref 136–145)
WBC # SPEC AUTO: 9.1 X10(3)/MCL (ref 4.5–11.5)

## 2023-09-06 PROCEDURE — 96361 HYDRATE IV INFUSION ADD-ON: CPT

## 2023-09-06 PROCEDURE — 83735 ASSAY OF MAGNESIUM: CPT

## 2023-09-06 PROCEDURE — 80053 COMPREHEN METABOLIC PANEL: CPT

## 2023-09-06 PROCEDURE — 84100 ASSAY OF PHOSPHORUS: CPT | Performed by: INTERNAL MEDICINE

## 2023-09-06 PROCEDURE — 85025 COMPLETE CBC W/AUTO DIFF WBC: CPT

## 2023-09-06 PROCEDURE — 25000003 PHARM REV CODE 250

## 2023-09-06 PROCEDURE — G0378 HOSPITAL OBSERVATION PER HR: HCPCS

## 2023-09-06 RX ADMIN — SODIUM CHLORIDE: 9 INJECTION, SOLUTION INTRAVENOUS at 07:09

## 2023-09-06 NOTE — PLAN OF CARE
Problem: Adult Inpatient Plan of Care  Goal: Plan of Care Review  9/6/2023 1254 by Miguelina Lopez RN  Outcome: Met  9/6/2023 1253 by Miguelina Lopez RN  Outcome: Adequate for Care Transition  Goal: Patient-Specific Goal (Individualized)  9/6/2023 1254 by Miguelina Lopez RN  Outcome: Met  9/6/2023 1253 by Miguelina Lopez RN  Outcome: Adequate for Care Transition  Goal: Absence of Hospital-Acquired Illness or Injury  9/6/2023 1254 by Miguelina Lopez RN  Outcome: Met  9/6/2023 1253 by Miguelina Lopez RN  Outcome: Adequate for Care Transition  Goal: Optimal Comfort and Wellbeing  9/6/2023 1254 by Miguelina Lopez RN  Outcome: Met  9/6/2023 1253 by Miguelina Lopez RN  Outcome: Adequate for Care Transition  Goal: Readiness for Transition of Care  9/6/2023 1254 by Miguelina Lopez RN  Outcome: Met  9/6/2023 1253 by Miguelina Lopez RN  Outcome: Adequate for Care Transition

## 2023-09-06 NOTE — PLAN OF CARE
09/06/23 1322   Discharge Assessment   Assessment Type Discharge Planning Assessment   Source of Information patient   When was your last doctors appointment?   (Needs a PCP)   Communicated BERNARD with patient/caregiver Date not available/Unable to determine   Reason For Admission Dehydration (primary DX) FLORENCIO (Acute Kidney Injury)   People in Home parent(s)  (Mother (Leslie Tilley))   Do you expect to return to your current living situation? Yes   Do you have help at home or someone to help you manage your care at home? No   Prior to hospitilization cognitive status: Alert/Oriented   Current cognitive status: Alert/Oriented   Walking or Climbing Stairs   (Ambulates independently)   Dressing/Bathing   (Performs ADLS)   Do you have any problems with:   (takes turn mother and self run errands and grocery shopping)   Home Accessibility   (6 steps with rails)   Home Layout Able to live on 1st floor   Equipment Currently Used at Home none   Readmission within 30 days? No   Patient currently being followed by outpatient case management? No   Do you currently have service(s) that help you manage your care at home? No   Do you take prescription medications? Yes   Do you have prescription coverage? Yes   Coverage Medicaid-- [facility]   Do you have any problems affording any of your prescribed medications? No   Is the patient taking medications as prescribed? yes   Who is going to help you get home at discharge? Mother --Leslie Tilley   How do you get to doctors appointments? family or friend will provide   Are you on dialysis? No   Do you take coumadin? No   DME Needed Upon Discharge  none   Discharge Plan discussed with: Patient   Transition of Care Barriers None   Discharge Plan A Home with family;Home   Discharge Plan B Home;Home with family   Physical Activity   On average, how many days per week do you engage in moderate to strenuous exercise (like a brisk walk)? 0 days   On average,  how many minutes do you engage in exercise at this level? 0 min   Financial Resource Strain   How hard is it for you to pay for the very basics like food, housing, medical care, and heating? Not hard   Housing Stability   In the last 12 months, was there a time when you were not able to pay the mortgage or rent on time? N   In the last 12 months, how many places have you lived? 1   Transportation Needs   In the past 12 months, has lack of transportation kept you from medical appointments or from getting medications? no   In the past 12 months, has lack of transportation kept you from meetings, work, or from getting things needed for daily living? No   Food Insecurity   Within the past 12 months, you worried that your food would run out before you got the money to buy more. Never true   Within the past 12 months, the food you bought just didn't last and you didn't have money to get more. Never true   Stress   Do you feel stress - tense, restless, nervous, or anxious, or unable to sleep at night because your mind is troubled all the time - these days? Only a littl   Social Connections   In a typical week, how many times do you talk on the phone with family, friends, or neighbors? More than 3   How often do you get together with friends or relatives? More than 3   How often do you attend Jehovah's witness or Hoahaoism services? Never   Do you belong to any clubs or organizations such as Jehovah's witness groups, unions, fraternal or athletic groups, or school groups? Yes  (Belongs to [facility])   How often do you attend meetings of the clubs or organizations you belong to? Never   Are you , , , , never , or living with a partner? Never marrie   Alcohol Use   Q1: How often do you have a drink containing alcohol? 2-4 pr month   Q2: How many drinks containing alcohol do you have on a typical day when you are drinking? 1 or 2   Q3: How often do you have six or more drinks on one occasion? Never   OTHER    Name(s) of People in Home Mother --Leslie Tilley

## 2023-09-07 LAB — PATH REV: NORMAL

## 2023-09-08 LAB
ALBUMIN % SPEP (OHS): 51.31
ALBUMIN SERPL-MCNC: 3.2 G/DL (ref 3.5–5)
ALBUMIN/GLOB SERPL: 1.1 RATIO (ref 1.1–2)
ALPHA 1 GLOB (OHS): 0.2 GM/DL
ALPHA 1 GLOB% (OHS): 3.23
ALPHA 2 GLOB % (OHS): 13.58
ALPHA 2 GLOB (OHS): 0.84 GM/DL
BETA GLOB (OHS): 0.96 GM/DL
BETA GLOB% (OHS): 15.42
GAMMA GLOBULIN % (OHS): 16.46
GAMMA GLOBULIN (OHS): 1.02 GM/DL
GLOBULIN SER-MCNC: 3 GM/DL (ref 2.4–3.5)
M SPIKE % (OHS): ABNORMAL
M SPIKE (OHS): ABNORMAL
PROT SERPL-MCNC: 6.2 GM/DL (ref 6.4–8.3)

## 2023-09-11 NOTE — DISCHARGE SUMMARY
[facility] Medicine Discharge Summary    Admit Date: 9/4/2023  Discharge Date and Time: 9/6/23 1:44 PM  Admitting Physician: KUSHAL Team  Discharging Physician: Marti Roy MD.  Primary Care Physician: Malena, Primary Doctor  Consults: None    Discharge Diagnoses:  Acute kidney injury suspected  2/2 ATN   Leukocytosis suspect 2/2 hemoconcentration   Hypercalcemia   Hypophosphatemia   Mild rhabdomyolysis   Lytic lesion of left iliac bone   Polysubstance abuse     Hospital Course:   29 y/o AAM with no significant PMH who presents to [facility] ED with initial complaint of general myalgias and abdominal cramping as well as multiple episodes of non-bloody non bilious emesis x2 days.  He also reports difficulty with urination since Friday stating he had significant pain upon initiation of urination.  Denies any dysuria or frequency.  Endorses bilateral flank pain beginning 2 days ago.  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  He attempted to stay hydrated but states he does not think he was drinking enough water and endorses associated heat exhaustion.  He denies any history of similar prior episodes and has no known medical history for which he takes daily medications.  He denies any recent otc NSAID use.  He does endorse recent marijuana use but denies any other recreational drug use or history of IV drug use.  He uses a vape daily but denies any cigarette use or other tobacco products.  He reports drinking alcohol socially and denies daily alcohol intake.  Of note, patient was recently released from an 8-year halfway sentence approx 2 weeks ago.      In the ED, patients vitals were stable with HR 87, RR 18, /69, temp 97.4, and spO2 99% on room air.  Labs were significant for WBC 20.84, BUN/Cr of 32.7/5.34, calcium of 12.5, phosphorous of 1.7, CPK of 694, UDS positive for amphetamines and marijuana, and  urinalysis with 2+ protein.  Retroperitoneal US performed showing no hydronephrosis with possible medical renal disease.  CT A/P was also performed which showed no acute abnormalities.  Hospital medicine service was consulted for admission and further management    With iv hydration, renal function normalized, Ca levels normalized, Spep was checked given lytic lesion, no M spike reported.     Pt was seen and examined on the day of discharge, discussed abstinence from drugs, discussed with case management, primary care establishment referral requested, pt was instructed to f/u, verbalized understanding, discharged to home in stable condition.Recommended to avoid NSAIDS  Vitals:  VITAL SIGNS: 24 HRS MIN & MAX LAST   No data recorded  (Patient refused.)   No data recorded 123/71   No data recorded  74   No data recorded 16   No data recorded 100 %       Physical Exam:  General: In no acute distress, afebrile  Chest: Clear to auscultation bilaterally  Heart: RRR, +S1, S2, no appreciable murmur  Abdomen: Soft, nontender, BS +  MSK: Warm, no lower extremity edema, no clubbing or cyanosis  Neurologic: Alert and oriented x4, Cranial nerve II-XII intact, Strength 5/5 in all 4 extremities    Procedures Performed: No admission procedures for hospital encounter.     Significant Diagnostic Studies: See Full reports for all details    Recent Labs   Lab 09/04/23  0632 09/05/23  0815 09/06/23  0405   WBC 20.84  20.84* 8.10 9.10   RBC 5.60 4.04* 4.09*   HGB 17.6 12.9* 13.0*   HCT 50.2 38.4* 38.4*   MCV 89.6 95.0* 93.9   MCH 31.4* 31.9* 31.8*   MCHC 35.1 33.6 33.9   RDW 13.3 13.8 13.6    265 237   MPV 10.6* 10.5* 10.8*       Recent Labs   Lab 09/04/23  0632 09/05/23  0402 09/05/23  0403 09/06/23  0405    138  --  137   K 5.0 4.2  --  4.5   CO2 21* 25  --  22   BUN 32.7* 30.1*  --  19.3   CREATININE 5.34* 1.96*  --  1.13   CALCIUM 12.5* 8.9  --  9.0   MG 2.30 2.20  --  2.00   ALBUMIN 5.8* 3.4* 3.2* 3.3*   ALKPHOS 88 54   --  55   ALT 10 7  --  6   AST 34 29  --  23   BILITOT 1.7* 1.0  --  0.6        Microbiology Results (last 7 days)       ** No results found for the last 168 hours. **             CT Abdomen Pelvis  Without Contrast  Narrative: EXAMINATION:  CT ABDOMEN PELVIS WITHOUT CONTRAST    CLINICAL HISTORY:  Abdominal pain, acute, nonlocalized;    TECHNIQUE:  Low dose axial images, sagittal and coronal reformations were obtained from the lung bases to the pubic symphysis.  No contrast was administered.  Automatic exposure control is utilized to reduce patient radiation exposure.    COMPARISON:  None    FINDINGS:  The lung bases are clear.    The liver appears normal.  No obvious liver mass or lesion is seen.    Gallbladder appears normal.  No gallstones are seen.    The pancreas appears normal.  No inflammatory changes are seen in the pancreatic region.    The spleen appears normal and adrenal glands appear normal.  No adrenal nodule is seen.    No nephrolithiasis is seen.  No hydronephrosis is seen.  No hydroureter is seen.  No ureteral stone is seen.    No colitis is seen.  No diverticulitis is seen.  No appendicitis is seen.    No free air seen.  No free fluid is seen.  The urinary bladder appears normal.    There is a lytic area seen within the iliac bone on the left side which has a benign appearance.  Impression: No acute abnormality seen    Electronically signed by: Tobi Cabral  Date:    09/04/2023  Time:    09:04  US Retroperitoneal Limited  Narrative: EXAMINATION:  US RETROPERITONEAL LIMITED    CLINICAL HISTORY:  Acute kidney failure, unspecified    COMPARISON:  None.    FINDINGS:  Grayscale and color Doppler sonographic evaluation of the kidneys and urinary bladder.    The right kidney measures 8.5 cm. The left kidney measures 9 cm.   No hydronephrosis.  There is slightly increased renal parenchymal echogenicity.    The bladder is not well distended.  Impression: 1. No hydronephrosis.  2. Possible medical  renal disease.    Electronically signed by: Zechariah Brown  Date:    09/04/2023  Time:    08:15         Medication List        CONTINUE taking these medications      omeprazole 20 MG capsule  Commonly known as: PRILOSEC            STOP taking these medications      chlorhexidine 0.12 % solution  Commonly known as: PERIDEX     hydrocodone-apap 7.5-325 MG/15 ML oral solution  Commonly known as: HYCET     ibuprofen 20 mg/mL oral liquid     ibuprofen 600 MG tablet  Commonly known as: ADVIL,MOTRIN     ibuprofen 800 MG tablet  Commonly known as: ADVIL,MOTRIN     ondansetron 4 MG Tbdl  Commonly known as: ZOFRAN-ODT               Explained in detail to the patient about the discharge plan, medications, and follow-up visits. Pt understands and agrees with the treatment plan  Discharge Disposition: Home or Self Care   Discharged Condition: stable  Diet-    Medications Per DC med rec  Activities as tolerated   Follow-up Information       Joselin Ring FNP Follow up on 10/3/2023.    Specialty: Nurse Practitioner  Why: Hospital Follow Up Scheduled for 10/03/23 at 12:15 pm.  Contact information:  [address] [place] [id number] [phone]                           For further questions contact hospitalist office    Discharge time 33 minutes    For worsening symptoms, chest pain, shortness of breath, increased abdominal pain, high grade fever, stroke or stroke like symptoms, immediately go to the nearest Emergency Room or call 911 as soon as possible.      Marti Mclean M.D on 9/6/23 . at 1:44 PM.

## 2023-09-12 ENCOUNTER — PATIENT OUTREACH (OUTPATIENT)
Dept: ADMINISTRATIVE | Facility: CLINIC | Age: 28
End: 2023-09-12
Payer: MEDICAID

## 2023-09-12 NOTE — PROGRESS NOTES
C3 nurse spoke with Amirha Moore for a TCC post hospital discharge follow up call. The patient has a scheduled [facility] appointment with Joselin Ring FNP (Nurse Practitioner) on 10/3/2023;  at 12:15 pm.

## 2023-10-03 ENCOUNTER — OFFICE VISIT (OUTPATIENT)
Dept: FAMILY MEDICINE | Facility: CLINIC | Age: 28
End: 2023-10-03
Payer: MEDICAID

## 2023-10-03 VITALS
BODY MASS INDEX: 29.7 KG/M2 | HEIGHT: 68 IN | WEIGHT: 196 LBS | OXYGEN SATURATION: 98 % | TEMPERATURE: 98 F | RESPIRATION RATE: 18 BRPM | SYSTOLIC BLOOD PRESSURE: 145 MMHG | DIASTOLIC BLOOD PRESSURE: 84 MMHG | HEART RATE: 83 BPM

## 2023-10-03 DIAGNOSIS — J02.9 SORE THROAT: Primary | ICD-10-CM

## 2023-10-03 DIAGNOSIS — R03.0 ELEVATED BLOOD PRESSURE READING: ICD-10-CM

## 2023-10-03 DIAGNOSIS — Z00.00 ENCOUNTER FOR WELLNESS EXAMINATION: ICD-10-CM

## 2023-10-03 DIAGNOSIS — K02.9 DENTAL CARIES: ICD-10-CM

## 2023-10-03 PROBLEM — S02.642D: Status: RESOLVED | Noted: 2020-01-23 | Resolved: 2023-10-03

## 2023-10-03 PROBLEM — N17.0 ACUTE RENAL FAILURE WITH TUBULAR NECROSIS: Status: RESOLVED | Noted: 2023-09-04 | Resolved: 2023-10-03

## 2023-10-03 PROBLEM — M79.641 RIGHT HAND PAIN: Status: RESOLVED | Noted: 2021-01-14 | Resolved: 2023-10-03

## 2023-10-03 PROBLEM — K04.7 DENTAL ABSCESS: Status: RESOLVED | Noted: 2020-06-23 | Resolved: 2023-10-03

## 2023-10-03 PROBLEM — S62.234A CLOSED NONDISPLACED FRACTURE OF BASE OF FIRST METACARPAL BONE OF RIGHT HAND: Status: RESOLVED | Noted: 2021-01-14 | Resolved: 2023-10-03

## 2023-10-03 LAB
ALBUMIN SERPL-MCNC: 4.2 G/DL (ref 3.5–5)
ALBUMIN/GLOB SERPL: 1.2 RATIO (ref 1.1–2)
ALP SERPL-CCNC: 77 UNIT/L (ref 40–150)
ALT SERPL-CCNC: 20 UNIT/L (ref 0–55)
APPEARANCE UR: CLEAR
AST SERPL-CCNC: 58 UNIT/L (ref 5–34)
BACTERIA #/AREA URNS AUTO: ABNORMAL /HPF
BASOPHILS # BLD AUTO: 0.04 X10(3)/MCL
BASOPHILS NFR BLD AUTO: 0.3 %
BILIRUB SERPL-MCNC: 0.8 MG/DL
BILIRUB UR QL STRIP.AUTO: NEGATIVE
BUN SERPL-MCNC: 11.7 MG/DL (ref 8.9–20.6)
C TRACH DNA SPEC QL NAA+PROBE: NOT DETECTED
CALCIUM SERPL-MCNC: 9.6 MG/DL (ref 8.4–10.2)
CHLORIDE SERPL-SCNC: 106 MMOL/L (ref 98–107)
CHOLEST SERPL-MCNC: 148 MG/DL
CHOLEST/HDLC SERPL: 3 {RATIO} (ref 0–5)
CO2 SERPL-SCNC: 24 MMOL/L (ref 22–29)
COLOR UR AUTO: YELLOW
CREAT SERPL-MCNC: 0.87 MG/DL (ref 0.73–1.18)
DEPRECATED CALCIDIOL+CALCIFEROL SERPL-MC: 33.4 NG/ML (ref 30–80)
EOSINOPHIL # BLD AUTO: 0.06 X10(3)/MCL (ref 0–0.9)
EOSINOPHIL NFR BLD AUTO: 0.5 %
ERYTHROCYTE [DISTWIDTH] IN BLOOD BY AUTOMATED COUNT: 13.1 % (ref 11.5–17)
EST. AVERAGE GLUCOSE BLD GHB EST-MCNC: 105.4 MG/DL
GFR SERPLBLD CREATININE-BSD FMLA CKD-EPI: >60 MLS/MIN/1.73/M2
GLOBULIN SER-MCNC: 3.5 GM/DL (ref 2.4–3.5)
GLUCOSE SERPL-MCNC: 101 MG/DL (ref 74–100)
GLUCOSE UR QL STRIP.AUTO: ABNORMAL
HAV IGM SERPL QL IA: NONREACTIVE
HBA1C MFR BLD: 5.3 %
HBV CORE IGM SERPL QL IA: NONREACTIVE
HBV SURFACE AG SERPL QL IA: NONREACTIVE
HCT VFR BLD AUTO: 40.3 % (ref 42–52)
HCV AB SERPL QL IA: NONREACTIVE
HDLC SERPL-MCNC: 56 MG/DL (ref 35–60)
HGB BLD-MCNC: 13.7 G/DL (ref 14–18)
HIV 1+2 AB+HIV1 P24 AG SERPL QL IA: NONREACTIVE
HYALINE CASTS #/AREA URNS LPF: ABNORMAL /LPF
IMM GRANULOCYTES # BLD AUTO: 0.04 X10(3)/MCL (ref 0–0.04)
IMM GRANULOCYTES NFR BLD AUTO: 0.3 %
KETONES UR QL STRIP.AUTO: NEGATIVE
LDLC SERPL CALC-MCNC: 73 MG/DL (ref 50–140)
LEUKOCYTE ESTERASE UR QL STRIP.AUTO: NEGATIVE
LYMPHOCYTES # BLD AUTO: 1.77 X10(3)/MCL (ref 0.6–4.6)
LYMPHOCYTES NFR BLD AUTO: 15 %
MCH RBC QN AUTO: 32.6 PG (ref 27–31)
MCHC RBC AUTO-ENTMCNC: 34 G/DL (ref 33–36)
MCV RBC AUTO: 96 FL (ref 80–94)
MONOCYTES # BLD AUTO: 0.61 X10(3)/MCL (ref 0.1–1.3)
MONOCYTES NFR BLD AUTO: 5.2 %
MUCOUS THREADS URNS QL MICRO: ABNORMAL /LPF
N GONORRHOEA DNA SPEC QL NAA+PROBE: NOT DETECTED
NEUTROPHILS # BLD AUTO: 9.31 X10(3)/MCL (ref 2.1–9.2)
NEUTROPHILS NFR BLD AUTO: 78.7 %
NITRITE UR QL STRIP.AUTO: NEGATIVE
NRBC BLD AUTO-RTO: 0 %
PH UR STRIP.AUTO: 6.5 [PH]
PLATELET # BLD AUTO: 274 X10(3)/MCL (ref 130–400)
PMV BLD AUTO: 10.8 FL (ref 7.4–10.4)
POTASSIUM SERPL-SCNC: 4.2 MMOL/L (ref 3.5–5.1)
PROT SERPL-MCNC: 7.7 GM/DL (ref 6.4–8.3)
PROT UR QL STRIP.AUTO: ABNORMAL
RBC # BLD AUTO: 4.2 X10(6)/MCL (ref 4.7–6.1)
RBC #/AREA URNS AUTO: ABNORMAL /HPF
RBC UR QL AUTO: NEGATIVE
SODIUM SERPL-SCNC: 138 MMOL/L (ref 136–145)
SOURCE (OHS): NORMAL
SP GR UR STRIP.AUTO: 1.03 (ref 1–1.03)
SQUAMOUS #/AREA URNS LPF: ABNORMAL /HPF
STREP A PCR (OHS): NOT DETECTED
T PALLIDUM AB SER QL: NONREACTIVE
T4 FREE SERPL-MCNC: 0.92 NG/DL (ref 0.7–1.48)
TRIGL SERPL-MCNC: 97 MG/DL (ref 34–140)
TSH SERPL-ACNC: 0.01 UIU/ML (ref 0.35–4.94)
UROBILINOGEN UR STRIP-ACNC: ABNORMAL
VIT B12 SERPL-MCNC: 468 PG/ML (ref 213–816)
VLDLC SERPL CALC-MCNC: 19 MG/DL
WBC # SPEC AUTO: 11.83 X10(3)/MCL (ref 4.5–11.5)
WBC #/AREA URNS AUTO: ABNORMAL /HPF

## 2023-10-03 PROCEDURE — 80061 LIPID PANEL: CPT | Performed by: NURSE PRACTITIONER

## 2023-10-03 PROCEDURE — 99385 PREV VISIT NEW AGE 18-39: CPT | Mod: S$PBB,,, | Performed by: NURSE PRACTITIONER

## 2023-10-03 PROCEDURE — 3079F PR MOST RECENT DIASTOLIC BLOOD PRESSURE 80-89 MM HG: ICD-10-PCS | Mod: CPTII,,, | Performed by: NURSE PRACTITIONER

## 2023-10-03 PROCEDURE — 84439 ASSAY OF FREE THYROXINE: CPT | Performed by: NURSE PRACTITIONER

## 2023-10-03 PROCEDURE — 36415 COLL VENOUS BLD VENIPUNCTURE: CPT | Performed by: NURSE PRACTITIONER

## 2023-10-03 PROCEDURE — 1159F PR MEDICATION LIST DOCUMENTED IN MEDICAL RECORD: ICD-10-PCS | Mod: CPTII,,, | Performed by: NURSE PRACTITIONER

## 2023-10-03 PROCEDURE — 3044F PR MOST RECENT HEMOGLOBIN A1C LEVEL <7.0%: ICD-10-PCS | Mod: CPTII,,, | Performed by: NURSE PRACTITIONER

## 2023-10-03 PROCEDURE — 83036 HEMOGLOBIN GLYCOSYLATED A1C: CPT | Performed by: NURSE PRACTITIONER

## 2023-10-03 PROCEDURE — 85025 COMPLETE CBC W/AUTO DIFF WBC: CPT | Performed by: NURSE PRACTITIONER

## 2023-10-03 PROCEDURE — 82607 VITAMIN B-12: CPT | Performed by: NURSE PRACTITIONER

## 2023-10-03 PROCEDURE — 87389 HIV-1 AG W/HIV-1&-2 AB AG IA: CPT | Performed by: NURSE PRACTITIONER

## 2023-10-03 PROCEDURE — 99385 PR PREVENTIVE VISIT,NEW,18-39: ICD-10-PCS | Mod: S$PBB,,, | Performed by: NURSE PRACTITIONER

## 2023-10-03 PROCEDURE — 81001 URINALYSIS AUTO W/SCOPE: CPT | Performed by: NURSE PRACTITIONER

## 2023-10-03 PROCEDURE — 99214 OFFICE O/P EST MOD 30 MIN: CPT | Mod: PBBFAC,PN | Performed by: NURSE PRACTITIONER

## 2023-10-03 PROCEDURE — 3077F PR MOST RECENT SYSTOLIC BLOOD PRESSURE >= 140 MM HG: ICD-10-PCS | Mod: CPTII,,, | Performed by: NURSE PRACTITIONER

## 2023-10-03 PROCEDURE — 1159F MED LIST DOCD IN RCRD: CPT | Mod: CPTII,,, | Performed by: NURSE PRACTITIONER

## 2023-10-03 PROCEDURE — 80074 ACUTE HEPATITIS PANEL: CPT | Performed by: NURSE PRACTITIONER

## 2023-10-03 PROCEDURE — 82306 VITAMIN D 25 HYDROXY: CPT | Performed by: NURSE PRACTITIONER

## 2023-10-03 PROCEDURE — 80053 COMPREHEN METABOLIC PANEL: CPT | Performed by: NURSE PRACTITIONER

## 2023-10-03 PROCEDURE — 87591 N.GONORRHOEAE DNA AMP PROB: CPT | Performed by: NURSE PRACTITIONER

## 2023-10-03 PROCEDURE — 87651 STREP A DNA AMP PROBE: CPT | Performed by: NURSE PRACTITIONER

## 2023-10-03 PROCEDURE — 3079F DIAST BP 80-89 MM HG: CPT | Mod: CPTII,,, | Performed by: NURSE PRACTITIONER

## 2023-10-03 PROCEDURE — 3044F HG A1C LEVEL LT 7.0%: CPT | Mod: CPTII,,, | Performed by: NURSE PRACTITIONER

## 2023-10-03 PROCEDURE — 3077F SYST BP >= 140 MM HG: CPT | Mod: CPTII,,, | Performed by: NURSE PRACTITIONER

## 2023-10-03 PROCEDURE — 84443 ASSAY THYROID STIM HORMONE: CPT | Performed by: NURSE PRACTITIONER

## 2023-10-03 PROCEDURE — 1160F RVW MEDS BY RX/DR IN RCRD: CPT | Mod: CPTII,,, | Performed by: NURSE PRACTITIONER

## 2023-10-03 PROCEDURE — 90686 IIV4 VACC NO PRSV 0.5 ML IM: CPT | Mod: PBBFAC,PN

## 2023-10-03 PROCEDURE — 3008F PR BODY MASS INDEX (BMI) DOCUMENTED: ICD-10-PCS | Mod: CPTII,,, | Performed by: NURSE PRACTITIONER

## 2023-10-03 PROCEDURE — 86780 TREPONEMA PALLIDUM: CPT | Performed by: NURSE PRACTITIONER

## 2023-10-03 PROCEDURE — 1160F PR REVIEW ALL MEDS BY PRESCRIBER/CLIN PHARMACIST DOCUMENTED: ICD-10-PCS | Mod: CPTII,,, | Performed by: NURSE PRACTITIONER

## 2023-10-03 PROCEDURE — 3008F BODY MASS INDEX DOCD: CPT | Mod: CPTII,,, | Performed by: NURSE PRACTITIONER
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
## 2023-10-12 ENCOUNTER — HOSPITAL ENCOUNTER (EMERGENCY)
Facility: HOSPITAL | Age: 28
Discharge: HOME | End: 2023-10-13
Attending: FAMILY MEDICINE
Payer: MEDICAID

## 2023-10-12 VITALS
SYSTOLIC BLOOD PRESSURE: 137 MMHG | HEART RATE: 97 BPM | WEIGHT: 200 LBS | OXYGEN SATURATION: 100 % | HEIGHT: 68 IN | RESPIRATION RATE: 20 BRPM | DIASTOLIC BLOOD PRESSURE: 85 MMHG | TEMPERATURE: 97 F | BODY MASS INDEX: 30.31 KG/M2
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
## 2023-10-12 DIAGNOSIS — S92.421B OPEN DISPLACED FRACTURE OF DISTAL PHALANX OF RIGHT GREAT TOE, INITIAL ENCOUNTER: ICD-10-CM

## 2023-10-12 PROCEDURE — 99284 EMERGENCY DEPT VISIT MOD MDM: CPT

## 2023-10-12 NOTE — Clinical Note
"Amirah Overton"Oscar was seen and treated in our emergency department on 10/12/2023.  He may return to work on 10/17/2023.       If you have any questions or concerns, please don't hesitate to call.      Kimmie Gallo  RN RN    "

## 2023-10-13 PROCEDURE — 63600175 PHARM REV CODE 636 W HCPCS: Performed by: FAMILY MEDICINE

## 2023-10-13 PROCEDURE — 90715 TDAP VACCINE 7 YRS/> IM: CPT | Performed by: FAMILY MEDICINE

## 2023-10-13 PROCEDURE — 90471 IMMUNIZATION ADMIN: CPT | Performed by: FAMILY MEDICINE

## 2023-10-13 PROCEDURE — 12001 RPR S/N/AX/GEN/TRNK 2.5CM/<: CPT

## 2023-10-13 PROCEDURE — 96372 THER/PROPH/DIAG INJ SC/IM: CPT | Mod: 59 | Performed by: FAMILY MEDICINE

## 2023-10-13 PROCEDURE — 25000003 PHARM REV CODE 250: Performed by: FAMILY MEDICINE

## 2023-10-13 RX ORDER — CEFAZOLIN SODIUM 1 G/3ML
1 INJECTION, POWDER, FOR SOLUTION INTRAMUSCULAR; INTRAVENOUS
Status: COMPLETED | OUTPATIENT
Start: 2023-10-13 | End: 2023-10-13

## 2023-10-13 RX ORDER — AMOXICILLIN AND CLAVULANATE POTASSIUM 875; 125 MG/1; MG/1
1 TABLET, FILM COATED ORAL 2 TIMES DAILY
Qty: 14 TABLET | Refills: 0 | Status: SHIPPED | OUTPATIENT
Start: 2023-10-13 | End: 2023-10-20

## 2023-10-13 RX ORDER — CIPROFLOXACIN 500 MG/1
500 TABLET ORAL 2 TIMES DAILY
Qty: 14 TABLET | Refills: 0 | Status: SHIPPED | OUTPATIENT
Start: 2023-10-13 | End: 2023-10-20

## 2023-10-13 RX ORDER — NAPROXEN 500 MG/1
500 TABLET ORAL 2 TIMES DAILY PRN
Qty: 20 TABLET | Refills: 0 | Status: SHIPPED | OUTPATIENT
Start: 2023-10-13 | End: 2023-10-23

## 2023-10-13 RX ORDER — LIDOCAINE HYDROCHLORIDE 10 MG/ML
8 INJECTION, SOLUTION EPIDURAL; INFILTRATION; INTRACAUDAL; PERINEURAL
Status: COMPLETED | OUTPATIENT
Start: 2023-10-13 | End: 2023-10-13

## 2023-10-13 RX ADMIN — BACITRACIN ZINC, NEOMYCIN SULFATE , POLYMYXIN B SULFATE. 1 EACH: 400; 3.5; 5 OINTMENT TOPICAL at 02:10

## 2023-10-13 RX ADMIN — LIDOCAINE HYDROCHLORIDE 80 MG: 10 INJECTION, SOLUTION EPIDURAL; INFILTRATION; INTRACAUDAL; PERINEURAL at 02:10

## 2023-10-13 RX ADMIN — TETANUS TOXOID, REDUCED DIPHTHERIA TOXOID AND ACELLULAR PERTUSSIS VACCINE, ADSORBED 0.5 ML: 5; 2.5; 8; 8; 2.5 SUSPENSION INTRAMUSCULAR at 12:10

## 2023-10-13 RX ADMIN — CEFAZOLIN 1 G: 330 INJECTION, POWDER, FOR SOLUTION INTRAMUSCULAR; INTRAVENOUS at 02:10
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
## 2023-10-22 ENCOUNTER — HOSPITAL ENCOUNTER (EMERGENCY)
Facility: HOSPITAL | Age: 28
Discharge: HOME | End: 2023-10-22
Attending: STUDENT IN AN ORGANIZED HEALTH CARE EDUCATION/TRAINING PROGRAM
Payer: MEDICAID

## 2023-10-22 VITALS
TEMPERATURE: 98 F | HEART RATE: 99 BPM | HEIGHT: 67 IN | BODY MASS INDEX: 30.1 KG/M2 | WEIGHT: 191.81 LBS | RESPIRATION RATE: 18 BRPM | OXYGEN SATURATION: 98 %

## 2023-10-22 DIAGNOSIS — Z48.02 VISIT FOR SUTURE REMOVAL: Primary | ICD-10-CM

## 2023-10-22 PROCEDURE — 99281 EMR DPT VST MAYX REQ PHY/QHP: CPT

## 2023-10-22 NOTE — Clinical Note
"Amirah Overton" Oscar was seen and treated in our emergency department on 10/22/2023.  He may return to work on 10/24/2023.       If you have any questions or concerns, please don't hesitate to call.      Amadou Mcbride PA"
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
## 2023-10-23 NOTE — DISCHARGE INSTRUCTIONS
Keep wound clean and dry.    Drink plenty of fluids and get a lot of rest.     Return to ER for any changes or worsening of symptoms.